# Patient Record
Sex: FEMALE | Race: WHITE | NOT HISPANIC OR LATINO | Employment: UNEMPLOYED | URBAN - METROPOLITAN AREA
[De-identification: names, ages, dates, MRNs, and addresses within clinical notes are randomized per-mention and may not be internally consistent; named-entity substitution may affect disease eponyms.]

---

## 2020-05-26 PROBLEM — J45.909 ASTHMA: Status: ACTIVE | Noted: 2020-05-26

## 2020-05-26 PROBLEM — F32.A DEPRESSION: Status: ACTIVE | Noted: 2020-05-26

## 2020-05-26 PROBLEM — K81.0 ACUTE CHOLECYSTITIS: Status: ACTIVE | Noted: 2020-05-26

## 2020-05-26 PROBLEM — D68.59 PROTEIN S DEFICIENCY: Status: ACTIVE | Noted: 2020-05-26

## 2020-05-26 PROBLEM — Z3A.15 15 WEEKS GESTATION OF PREGNANCY: Status: ACTIVE | Noted: 2020-05-26

## 2020-05-26 PROBLEM — Z86.69 HISTORY OF GUILLAIN-BARRE SYNDROME: Status: ACTIVE | Noted: 2020-05-26

## 2020-05-26 PROBLEM — O30.92: Status: ACTIVE | Noted: 2020-05-26

## 2020-05-26 PROBLEM — E03.9 HYPOTHYROID: Status: ACTIVE | Noted: 2020-05-26

## 2020-05-27 PROBLEM — K81.0 ACUTE CHOLECYSTITIS: Status: RESOLVED | Noted: 2020-05-26 | Resolved: 2020-05-27

## 2020-05-27 PROBLEM — K80.20 SYMPTOMATIC CHOLELITHIASIS: Status: ACTIVE | Noted: 2020-05-27

## 2020-05-27 PROBLEM — Z86.69 HISTORY OF GUILLAIN-BARRE SYNDROME: Status: RESOLVED | Noted: 2020-05-26 | Resolved: 2020-05-27

## 2020-05-27 PROBLEM — J45.909 ASTHMA: Status: ACTIVE | Noted: 2020-05-27

## 2020-06-10 ENCOUNTER — NURSE TRIAGE (OUTPATIENT)
Dept: ADMINISTRATIVE | Facility: CLINIC | Age: 25
End: 2020-06-10

## 2020-06-10 NOTE — TELEPHONE ENCOUNTER
Reason for Disposition   Health Information question, no triage required and triager able to answer question    Protocols used: INFORMATION ONLY CALL-A-    Day 14 post procedural symptom tracker call. Patient denies any symptoms or potential covid symptoms per communicable disease screening questionnaire. She has a follow up appt with surgeon next week but is hoping it can be rescheduled until the follow week 6/22. Advised triage nurse would send message regarding her request. No additional follow up from this program required at this time.

## 2021-05-03 PROBLEM — Z3A.15 15 WEEKS GESTATION OF PREGNANCY: Status: RESOLVED | Noted: 2020-05-26 | Resolved: 2021-05-03
